# Patient Record
Sex: FEMALE | Race: WHITE | NOT HISPANIC OR LATINO | Employment: UNEMPLOYED | URBAN - METROPOLITAN AREA
[De-identification: names, ages, dates, MRNs, and addresses within clinical notes are randomized per-mention and may not be internally consistent; named-entity substitution may affect disease eponyms.]

---

## 2020-04-21 PROBLEM — R56.1 SEIZURE AFTER HEAD INJURY: Status: ACTIVE | Noted: 2020-04-21

## 2021-05-12 ENCOUNTER — PATIENT MESSAGE (OUTPATIENT)
Dept: RESEARCH | Facility: HOSPITAL | Age: 31
End: 2021-05-12

## 2022-12-21 PROBLEM — F32.A DEPRESSIVE DISORDER: Status: ACTIVE | Noted: 2021-06-08

## 2022-12-21 PROBLEM — M79.7 FIBROMYALGIA: Status: ACTIVE | Noted: 2021-06-08

## 2023-03-01 PROBLEM — R56.9 SEIZURE: Status: ACTIVE | Noted: 2020-04-21

## 2023-03-02 PROBLEM — G43.909 MIGRAINE: Status: ACTIVE | Noted: 2023-03-02

## 2023-03-02 PROBLEM — F41.9 ANXIETY: Status: ACTIVE | Noted: 2023-03-02

## 2023-03-21 ENCOUNTER — SOCIAL WORK (OUTPATIENT)
Dept: ADMINISTRATIVE | Facility: OTHER | Age: 33
End: 2023-03-21

## 2023-03-21 NOTE — PROGRESS NOTES
Sw received a referral to assist with counseling. [facility] had received a consult to see Patient. However, the Clinic does not have a counselor on staff. Sw mailed Patient a list of counselors located in the [place] area. She was encouraged to contact one to schedule an assessment if she was still in need of services.    Debora Dixon LCSW    [phone]

## 2024-02-02 PROBLEM — F44.5 PSYCHOGENIC NONEPILEPTIC SEIZURE: Status: ACTIVE | Noted: 2024-02-02

## 2024-03-04 PROBLEM — I67.89 CEREBRAL MICROVASCULAR DISEASE: Status: ACTIVE | Noted: 2024-03-04

## 2024-03-04 PROBLEM — H47.393: Status: ACTIVE | Noted: 2024-03-04

## 2024-06-25 PROBLEM — G43.E19 INTRACTABLE CHRONIC MIGRAINE WITH AURA AND WITHOUT STATUS MIGRAINOSUS: Status: ACTIVE | Noted: 2023-03-02

## 2024-07-19 ENCOUNTER — SOCIAL WORK (OUTPATIENT)
Dept: ADMINISTRATIVE | Facility: OTHER | Age: 34
End: 2024-07-19

## 2024-07-19 NOTE — PROGRESS NOTES
Sw received a referral to assist with therapy resources. [facility] had received a consult to see Patient. However, the Clinic does not have a therapist available to see her. Sw mailed Patient a letter explaining this. She was provided the contact information for some in-network providers. Sw encouraged Patient to contact one to schedule an assessment if she was still in need of services.    Debora Dixon, PAPA    [phone]